# Patient Record
Sex: FEMALE | Race: WHITE | NOT HISPANIC OR LATINO | URBAN - METROPOLITAN AREA
[De-identification: names, ages, dates, MRNs, and addresses within clinical notes are randomized per-mention and may not be internally consistent; named-entity substitution may affect disease eponyms.]

---

## 2018-05-14 NOTE — PROGRESS NOTES
Subjective:     Patient ID: Dora Fonseca is a 53 y.o. female sent for consultation arthritis hands by Dr. Maximino Dorado.    Chief Complaint: No chief complaint on file.       HPI     Has been having bilateral hand pain R>L with burning sensation, very sensitive & fingers feel tight. Sxs for at least a year.  Has seen Dr. Guerrero & he prescribed a topical that didn't help. He also told her to put her hands in ice, but she has not done that.  Hands feel better under hot water. Had numbness in both arms in February and used CTS braces which helped her numbness but since has had pain in base of R thumb. Recently has developed bony swelling especially in DIPs.   Has taken occasional advil gel tabs and they help her sleep on days that she's used her hands a lot (gardening, volleyball, etc) cause hands throb when she uses them. Arthritis Strength tylenol help just as well.  AM stiffness x 5 minutes in hands and knees. Denies other joint pain, joint swelling, Raynaud's, dysphagia, tight skin, alopecia, oral ulcers, sicca symptoms, pleurisy, pericarditis, photosensitivity, skin rashes, miscarriages (0/3--one 6 weeks early with Down's syndrome), thromboses. No personal hx of CTD but has a maternal uncle with RA and a maternal cousin RA.    Had a hx of bilateral knee pain, but that has resolved after she lost weight.  Has lost 21 lbs since February on a [employer] diet ([employer] diet)--eats 6 x/day; diet loaded with probiotics & vitamins.    Had scoliosis surgery and had dannielle inserted as a child. She has been feeling the dannielle in her lower back but again the discomfort has decreased since losing weight.       She gets labs done by Dr. Sinclair periodically and they have been ok.     Current Outpatient Prescriptions   Medication Sig Dispense Refill    losartan (COZAAR) 50 MG tablet TK 1 T PO D  3    spironolactone (ALDACTONE) 25 MG tablet        No current facility-administered medications for this visit.        Review of patient's  allergies indicates:  No Known Allergies    Review of Systems   Constitutional: Positive for diaphoresis and unexpected weight change. Negative for fatigue and fever.   HENT: Negative.  Negative for mouth sores, sore throat, tinnitus and trouble swallowing.    Eyes: Negative.  Negative for visual disturbance.   Respiratory: Negative.  Negative for cough, choking, chest tightness and shortness of breath.    Cardiovascular: Negative.  Negative for chest pain, palpitations and leg swelling.   Gastrointestinal: Negative.  Negative for abdominal distention, abdominal pain, blood in stool, constipation, diarrhea, nausea and vomiting.        Hx of irritable bowel.   Genitourinary: Negative for frequency, hematuria and menstrual problem.        Hysterectomy age 41 wo oophorectomy.  Hx of overactive baldder   Musculoskeletal: Positive for arthralgias, back pain and joint swelling. Negative for myalgias, neck pain and neck stiffness.   Skin: Negative.  Negative for rash.   Neurological: Positive for numbness. Negative for dizziness, syncope, weakness and light-headedness.   Hematological: Negative for adenopathy. Does not bruise/bleed easily.   Psychiatric/Behavioral: Negative.  Negative for dysphoric mood and sleep disturbance. The patient is not nervous/anxious.        Past Medical History:   Diagnosis Date    Colon polyp     [facility]IS    Fibrocystic breast     Hypertension     Scoliosis    HBP was severe at one time.    Past Surgical History:   Procedure Laterality Date    CHOLECYSTECTOMY      DILATION AND CURETTAGE OF UTERUS      Stanton rods      HYSTERECTOMY         Family History   Problem Relation Age of Onset    Breast cancer Sister     Colon cancer Neg Hx     Ovarian cancer Neg Hx    3 daughters: 35, 33 & 15 yr old; 15 yr old with Down's.  Grandson graduating from 7th grade tonight.    Social History   Substance Use Topics    Smoking status: Never Smoker    Smokeless tobacco: Not on file    Alcohol use  "Not on file   [facility]ina lored[facility].  Plays volleyball once a week (used to play 3 x/week)  Does yard work.     x 2;     Objective:     BP (!) 90/54   Pulse 68   Resp 18   Wt 65 kg (143 lb 6.4 oz)   BMI 25.40 kg/m²   Recalculated BMI based on ht of 62" = 26.2  No lightheadedness; no dizziness;    Physical Exam   Vitals reviewed.  Constitutional: She is oriented to person, place, and time and well-developed, well-nourished, and in no distress. No distress.   HENT:   Head: Normocephalic and atraumatic.   Mouth/Throat: Oropharynx is clear and moist. No oropharyngeal exudate.   No facial rashes  Parotids not enlarged  No oral ulcers   Eyes: Conjunctivae and EOM are normal. Pupils are equal, round, and reactive to light. Right eye exhibits no discharge. Left eye exhibits no discharge. No scleral icterus.   Neck: Neck supple. No JVD present. No tracheal deviation present. No thyromegaly present.   Cardiovascular: Normal rate, regular rhythm, normal heart sounds and intact distal pulses.  Exam reveals no gallop and no friction rub.    No murmur heard.  Pulmonary/Chest: Effort normal and breath sounds normal. No respiratory distress. She has no wheezes. She has no rales. She exhibits no tenderness.   Abdominal: Soft. Bowel sounds are normal. She exhibits no distension and no mass. There is no splenomegaly or hepatomegaly. There is no tenderness. There is no rebound and no guarding.   Lymphadenopathy:     She has no cervical adenopathy.        Right: No inguinal adenopathy present.        Left: No inguinal adenopathy present.   Neurological: She is alert and oriented to person, place, and time. She has normal reflexes. No cranial nerve deficit. Gait normal.   Proximal and distal muscle strength 5/5.  Tinels and Phalen's bilaterally negative.  Makes strong o's.   Skin: Skin is warm and dry. No rash noted. She is not diaphoretic.     Psychiatric: Mood, memory, affect and judgment normal. "   Musculoskeletal: Normal range of motion. She exhibits no edema or tenderness.   Cspine FROM no tenderness  Tspine FROM no tenderness  Lspine long vertical scar; adequate ROM; no tenderness.  TMJ: unremarkable  Shoulders: FROM; no synovitis;  Elbows: FROM; no synovitis; no tophi or nodules  Wrists: FROM; no synovitis;    MCPs:Squaring of 1st C-MCP jts bilaterally with TTP on R;  no synovitis; no metacarpalgia;  ok;  PIPs:FROM; no synovitis; small Sai's  DIPs: Heberden's nodes; mildly tender.; no synovitis;   HIPS: FROM  Knees: FROM; bilateral PF crepitus; no synovitis; no instability;  Ankles: FROM: no synovitis   Toes: ok; no metatarsalgia                    Assessment:   Bilateral hand pain  Bilateral OA of hands  Sxs c/w CTS bialterally  Bilateral PF crepitus  S/P scoliosis with (Stanton?) dannielle insertion  Hypertension with current BP low  Overweight      Plan:   Reviewed OA of hands & possibly of knees  Discussed getting imaging, but really will not contribute much and patient agreed.  Reviewed CTS  Symptomatic, non invasive rx best.  Try Paraffin wax baths to hands  Use acetaminophen up to a max of 3,000 mg/24 hr first b/f using NSAIDs  Daily, low impact, dedicated, aerobic exercise.  Showed lower abdominal exercises for LBP  RTC prn    CC: Maximino Sinclair MD

## 2018-05-16 ENCOUNTER — INITIAL CONSULT (OUTPATIENT)
Dept: RHEUMATOLOGY | Facility: CLINIC | Age: 53
End: 2018-05-16
Payer: COMMERCIAL

## 2018-05-16 VITALS
HEART RATE: 68 BPM | RESPIRATION RATE: 18 BRPM | WEIGHT: 143.38 LBS | DIASTOLIC BLOOD PRESSURE: 54 MMHG | BODY MASS INDEX: 25.4 KG/M2 | SYSTOLIC BLOOD PRESSURE: 90 MMHG

## 2018-05-16 DIAGNOSIS — M79.642 BILATERAL HAND PAIN: Primary | ICD-10-CM

## 2018-05-16 DIAGNOSIS — M79.641 BILATERAL HAND PAIN: Primary | ICD-10-CM

## 2018-05-16 DIAGNOSIS — Z55.9 EDUCATIONAL CIRCUMSTANCE: ICD-10-CM

## 2018-05-16 DIAGNOSIS — M19.042 PRIMARY OSTEOARTHRITIS OF BOTH HANDS: ICD-10-CM

## 2018-05-16 DIAGNOSIS — E66.3 OVERWEIGHT (BMI 25.0-29.9): ICD-10-CM

## 2018-05-16 DIAGNOSIS — M19.041 PRIMARY OSTEOARTHRITIS OF BOTH HANDS: ICD-10-CM

## 2018-05-16 PROCEDURE — 99243 OFF/OP CNSLTJ NEW/EST LOW 30: CPT | Mod: S$GLB,,, | Performed by: INTERNAL MEDICINE

## 2018-05-16 PROCEDURE — 99999 PR PBB SHADOW E&M-EST. PATIENT-LVL III: CPT | Mod: PBBFAC,,, | Performed by: INTERNAL MEDICINE

## 2018-05-16 RX ORDER — POLYETHYLENE GLYCOL 3350 17 G/17G
17 POWDER, FOR SOLUTION ORAL
COMMUNITY
End: 2018-05-16

## 2018-05-16 SDOH — SOCIAL DETERMINANTS OF HEALTH (SDOH): PROBLEMS RELATED TO EDUCATION AND LITERACY, UNSPECIFIED: Z55.9

## 2018-05-16 ASSESSMENT — ROUTINE ASSESSMENT OF PATIENT INDEX DATA (RAPID3)
MDHAQ FUNCTION SCORE: .2
AM STIFFNESS SCORE: 1, YES
TOTAL RAPID3 SCORE: 2.39
PATIENT GLOBAL ASSESSMENT SCORE: 1
PSYCHOLOGICAL DISTRESS SCORE: 0
WHEN YOU AWAKENED IN THE MORNING OVER THE LAST WEEK, PLEASE INDICATE THE AMOUNT OF TIME IT TAKES UNTIL YOU ARE AS LIMBER AS YOU WILL BE FOR THE DAY: 2 MIN
FATIGUE SCORE: 2.5
PAIN SCORE: 5.5

## 2018-05-16 NOTE — PATIENT INSTRUCTIONS
Paraffin wax baths.    Acetaminophen up to a max of 3,000 mg/24 hr    Daily, low impact, dedicated, aerobic exercise.    Do lower abdominal exercises shown.